# Patient Record
Sex: MALE | Race: WHITE | URBAN - METROPOLITAN AREA
[De-identification: names, ages, dates, MRNs, and addresses within clinical notes are randomized per-mention and may not be internally consistent; named-entity substitution may affect disease eponyms.]

---

## 2023-10-20 ENCOUNTER — OFFICE VISIT (OUTPATIENT)
Dept: FAMILY MEDICINE CLINIC | Facility: CLINIC | Age: 33
End: 2023-10-20

## 2023-10-20 VITALS
WEIGHT: 288 LBS | OXYGEN SATURATION: 95 % | HEIGHT: 72 IN | SYSTOLIC BLOOD PRESSURE: 130 MMHG | DIASTOLIC BLOOD PRESSURE: 82 MMHG | HEART RATE: 107 BPM | RESPIRATION RATE: 16 BRPM | TEMPERATURE: 98.8 F | BODY MASS INDEX: 39.01 KG/M2

## 2023-10-20 DIAGNOSIS — Z00.00 ANNUAL PHYSICAL EXAM: Primary | ICD-10-CM

## 2023-10-20 DIAGNOSIS — Z72.0 NICOTINE ABUSE: ICD-10-CM

## 2023-10-20 DIAGNOSIS — M25.562 CHRONIC PAIN OF LEFT KNEE: ICD-10-CM

## 2023-10-20 DIAGNOSIS — F90.9 ATTENTION DEFICIT HYPERACTIVITY DISORDER (ADHD), UNSPECIFIED ADHD TYPE: ICD-10-CM

## 2023-10-20 DIAGNOSIS — G89.29 CHRONIC PAIN OF LEFT KNEE: ICD-10-CM

## 2023-10-20 DIAGNOSIS — Z23 ENCOUNTER FOR IMMUNIZATION: ICD-10-CM

## 2023-10-20 DIAGNOSIS — F31.9 BIPOLAR DEPRESSION (HCC): ICD-10-CM

## 2023-10-20 DIAGNOSIS — L73.9 FOLLICULITIS: ICD-10-CM

## 2023-10-20 DIAGNOSIS — Z13.6 SCREENING FOR HEART DISEASE: ICD-10-CM

## 2023-10-20 DIAGNOSIS — Z13.29 SCREENING FOR THYROID DISORDER: ICD-10-CM

## 2023-10-20 DIAGNOSIS — G47.33 OSA (OBSTRUCTIVE SLEEP APNEA): ICD-10-CM

## 2023-10-20 DIAGNOSIS — Z13.1 SCREENING FOR DIABETES MELLITUS: ICD-10-CM

## 2023-10-20 DIAGNOSIS — F43.10 PTSD (POST-TRAUMATIC STRESS DISORDER): ICD-10-CM

## 2023-10-20 RX ORDER — GABAPENTIN 100 MG/1
100 CAPSULE ORAL DAILY
COMMUNITY
Start: 2023-09-01

## 2023-10-20 RX ORDER — CEPHALEXIN 500 MG/1
500 CAPSULE ORAL 2 TIMES DAILY
Qty: 20 CAPSULE | Refills: 0 | Status: SHIPPED | OUTPATIENT
Start: 2023-10-20 | End: 2023-10-30

## 2023-10-20 RX ORDER — LAMOTRIGINE 200 MG/1
200 TABLET ORAL DAILY
COMMUNITY
Start: 2023-09-01

## 2023-10-20 RX ORDER — MODAFINIL 100 MG/1
100 TABLET ORAL DAILY
COMMUNITY

## 2023-10-20 RX ORDER — NICOTINE 21 MG/24HR
1 PATCH, TRANSDERMAL 24 HOURS TRANSDERMAL EVERY 24 HOURS
Qty: 28 PATCH | Refills: 0 | Status: SHIPPED | OUTPATIENT
Start: 2023-10-20

## 2023-10-20 NOTE — ASSESSMENT & PLAN NOTE
Unremarkable exam, flu shot administered. Ordered screening labs. Encourage healthy diet, regular exercise.

## 2023-10-20 NOTE — PROGRESS NOTES
[facility]    NAME: Luann Elizabeth  AGE: 34 y.o. SEX: male  : 1990     DATE: 10/20/2023     Assessment and Plan:     Problem List Items Addressed This Visit          Respiratory    AMAURY (obstructive sleep apnea)     Compliant with cpap            Musculoskeletal and Integument    Folliculitis     Started after using his father's razor to shave his head. Using proactiv on his scalp, he says it helps but if he doesn't use it gets worse. Treat x10 days with cephalexin. Relevant Medications    cephalexin (KEFLEX) 500 mg capsule       Other    ADHD     Stable with current management. , followed by psych. Continue modafinil. Relevant Medications    modafinil (PROVIGIL) 100 mg tablet    nicotine (NICODERM CQ) 21 mg/24 hr TD 24 hr patch    Annual physical exam - Primary     Unremarkable exam, flu shot administered. Ordered screening labs. Encourage healthy diet, regular exercise. Relevant Orders    Comprehensive metabolic panel    CBC and differential    UA w Reflex to Microscopic w Reflex to Culture    Bipolar depression ([address])     Stable with current management. , continue current medication. Managed by psych. Relevant Medications    modafinil (PROVIGIL) 100 mg tablet    nicotine (NICODERM CQ) 21 mg/24 hr TD 24 hr patch    Chronic pain of left knee     No trauma or injury, unremarkable on exam.  Pain greatest when flexed or hyper extended. Referral to ortho. Relevant Orders    Ambulatory Referral to Orthopedic Surgery    Nicotine abuse     Wants to quit nicotine vape, start nrt patch 21 g daily. Wean as tolerated. Relevant Medications    nicotine (NICODERM CQ) 21 mg/24 hr TD 24 hr patch    PTSD (post-traumatic stress disorder)     Stable with current management, managed by psychiatry.          Relevant Medications    modafinil (PROVIGIL) 100 mg tablet    nicotine (V[place]) 21 mg/24 hr TD 24 hr patch     Other Visit Diagnoses       Encounter for immunization        Relevant Orders    influenza vaccine, quadrivalent, 0.5 mL, preservative-free, for adult and pediatric patients 6 mos+ (AFLURIA, FLUARIX, FLULAVAL, FLUZONE) (Completed)    Screening for heart disease        Relevant Orders    Lipid panel    Screening for diabetes mellitus        Relevant Orders    Comprehensive metabolic panel    Hemoglobin A1C    Screening for thyroid disorder        Relevant Orders    TSH, 3rd generation with Free T4 reflex            Immunizations and preventive care screenings were discussed with patient today. Appropriate education was printed on patient's after visit summary. Counseling:  Dental Health: discussed importance of regular tooth brushing, flossing, and dental visits. Injury prevention: discussed safety/seat belts, safety helmets, smoke detectors, carbon dioxide detectors, and smoking near bedding or upholstery. Sexual health: discussed sexually transmitted diseases, partner selection, use of condoms, avoidance of unintended pregnancy, and contraceptive alternatives. Exercise: the importance of regular exercise/physical activity was discussed. Recommend exercise 3-5 times per week for at least 30 minutes. BMI Counseling: Body mass index is 39.61 kg/m². The BMI is above normal. Nutrition recommendations include decreasing portion sizes, encouraging healthy choices of fruits and vegetables, consuming healthier snacks, moderation in carbohydrate intake, increasing intake of lean protein, reducing intake of saturated and trans fat and reducing intake of cholesterol. Exercise recommendations include moderate physical activity 150 minutes/week, exercising 3-5 times per week and strength training exercises. Rationale for BMI follow-up plan is due to patient being overweight or obese. No follow-ups on file.      Chief Complaint:     Chief Complaint   Patient presents with Establish Care     Patient being seen to establish care      History of Present Illness:     Adult Annual Physical   Patient here for a comprehensive physical exam. The patient reports problems - knee pain, scalp rash . Diet and Physical Activity  Diet/Nutrition: consuming 3-5 servings of fruits/vegetables daily and fairly well balanced  . Exercise: no formal exercise. Depression Screening  PHQ-2/9 Depression Screening    Little interest or pleasure in doing things: 0 - not at all  Feeling down, depressed, or hopeless: 0 - not at all  PHQ-2 Score: 0  PHQ-2 Interpretation: Negative depression screen       General Health  Sleep: sleeps poorly, gets 4-6 hours of sleep on average, and has sleep apnea . Hearing: normal - bilateral.  Vision: goes for regular eye exams and wears glasses and contacts. Dental: no dental visits for >1 year, brushes teeth once daily, and flosses teeth occasionally.  Health  History of STDs?: no.    Advanced Care Planning  Do you have an advanced directive? no  Do you have a durable medical power of ? no     Review of Systems:     Review of Systems   Constitutional:  Negative for activity change, appetite change, chills, diaphoresis, fatigue, fever and unexpected weight change. HENT:  Negative for hearing loss. Eyes:  Negative for visual disturbance. Respiratory:  Negative for cough, chest tightness and shortness of breath. Cardiovascular:  Negative for chest pain, palpitations and leg swelling. Gastrointestinal:  Negative for abdominal pain, constipation, diarrhea, nausea and vomiting. Genitourinary:  Negative for difficulty urinating, dysuria and frequency. Musculoskeletal:  Positive for arthralgias. Negative for joint swelling and myalgias. Skin:  Positive for rash (scalp). Allergic/Immunologic: Negative for environmental allergies.    Neurological:  Negative for dizziness, weakness, light-headedness (only if he does not eat), numbness and headaches. Psychiatric/Behavioral:  Negative for dysphoric mood, self-injury, sleep disturbance and suicidal ideas. The patient is not nervous/anxious. Past Medical History:     Past Medical History:   Diagnosis Date    ADHD     Arthritis     childhood    AMAURY (obstructive sleep apnea)     PTSD (post-traumatic stress disorder)       Past Surgical History:     Past Surgical History:   Procedure Laterality Date    KNEE SURGERY Right 2004    medial meniscus repair      Social History:     Social History     Socioeconomic History    Marital status: Single     Spouse name: None    Number of children: None    Years of education: None    Highest education level: None   Occupational History    None   Tobacco Use    Smoking status: Former     Packs/day: 0.50     Years: 9.00     Total pack years: 4.50     Types: Cigarettes     Quit date:      Years since quittin.8    Smokeless tobacco: Former     Types: Chew     Quit date:    Vaping Use    Vaping Use: Every day    Substances: Nicotine   Substance and Sexual Activity    Alcohol use: Not Currently     Comment: rarely (once a year)    Drug use: Never    Sexual activity: Yes     Partners: Female     Birth control/protection: I.U.D.    Other Topics Concern    None   Social History Narrative    Lives with girlfriend and daughter    Works full time    Caffeine: coffee and energy drinks daily     Social Determinants of Health     Financial Resource Strain: Not on file   Food Insecurity: Not on file   Transportation Needs: Not on file   Physical Activity: Not on file   Stress: Not on file   Social Connections: Not on file   Intimate Partner Violence: Not on file   Housing Stability: Not on file      Family History:     Family History   Problem Relation Age of Onset    Breast cancer Mother     No Known Problems Father     Febrile seizures Daughter     COPD Maternal Grandmother     Heart disease Maternal Grandfather     Coronary artery disease Maternal Grandfather Diabetes Maternal Grandfather     No Known Problems Half-Brother     No Known Problems Half-Brother       Current Medications:     Current Outpatient Medications   Medication Sig Dispense Refill    cephalexin (KEFLEX) 500 mg capsule Take 1 capsule (500 mg total) by mouth 2 (two) times a day for 10 days 20 capsule 0    gabapentin (NEURONTIN) 100 mg capsule Take 100 mg by mouth daily Take 2 (100mg) tablets daily      lamoTRIgine (LaMICtal) 200 MG tablet Take 200 mg by mouth daily      modafinil (PROVIGIL) 100 mg tablet Take 100 mg by mouth daily      nicotine (NICODERM CQ) 21 mg/24 hr TD 24 hr patch Place 1 patch on the skin over 24 hours every 24 hours 28 patch 0     No current facility-administered medications for this visit. Allergies: Allergies   Allergen Reactions    [id number]-Vrwuukd-Pramoxine Rash      Physical Exam:     /82 (BP Location: Left arm, Patient Position: Sitting, Cuff Size: Large)   Pulse (!) 107   Temp 98.8 °F (37.1 °C) (Tympanic)   Resp 16   Ht 5' 11.5" (1.816 m)   Wt 131 kg (288 lb)   SpO2 95%   BMI 39.61 kg/m²     Physical Exam  Vitals reviewed. Constitutional:       General: He is awake. He is not in acute distress. Appearance: Normal appearance. He is well-developed and well-groomed. He is not ill-appearing. HENT:      Head: Normocephalic and atraumatic. Right Ear: Hearing, tympanic membrane, ear canal and external ear normal.      Left Ear: Hearing, tympanic membrane, ear canal and external ear normal.      Nose: Nose normal.      Mouth/Throat:      Lips: Pink. Mouth: Mucous membranes are moist.      Pharynx: Oropharynx is clear. Tonsils: 2+ on the right. 2+ on the left. Eyes:      General: Lids are normal.      Conjunctiva/sclera: Conjunctivae normal.      Pupils: Pupils are equal, round, and reactive to light. Neck:      Thyroid: No thyromegaly. Vascular: Normal carotid pulses. No carotid bruit or JVD.    Cardiovascular:      Rate and Rhythm: Normal rate and regular rhythm. Pulses: Normal pulses. Heart sounds: Normal heart sounds. No murmur heard. Pulmonary:      Effort: Pulmonary effort is normal.      Breath sounds: Normal breath sounds. Abdominal:      General: Abdomen is flat. Bowel sounds are normal.      Palpations: Abdomen is soft. Tenderness: There is no abdominal tenderness. Musculoskeletal:         General: Normal range of motion. Cervical back: Normal range of motion. Left knee: No swelling or deformity. Normal range of motion. Tenderness present over the patellar tendon. Right lower leg: No edema. Left lower leg: No edema. Lymphadenopathy:      Cervical: No cervical adenopathy. Skin:     General: Skin is warm and dry. Capillary Refill: Capillary refill takes less than 2 seconds. Neurological:      Mental Status: He is alert and oriented to person, place, and time. Motor: Motor function is intact. Psychiatric:         Attention and Perception: Attention normal.         Mood and Affect: Mood normal.         Speech: Speech normal.         Behavior: Behavior normal. Behavior is cooperative. Thought Content:  Thought content normal.         Cognition and Memory: Cognition normal.         Judgment: Judgment normal.          Yaritza Medina, [address] [place] [address]

## 2023-10-20 NOTE — PATIENT INSTRUCTIONS
Wellness Visit for Adults   AMBULATORY CARE:   A wellness visit  is when you see your healthcare provider to get screened for health problems. Your healthcare provider will also give you advice on how to stay healthy. Write down your questions so you remember to ask them. Ask your healthcare provider how often you should have a wellness visit. What happens at a wellness visit:  Your healthcare provider will ask about your health, and your family history of health problems. This includes high blood pressure, heart disease, and cancer. He or she will ask if you have symptoms that concern you, if you smoke, and about your mood. You may also be asked about your intake of medicines, supplements, food, and alcohol. Any of the following may be done: Your weight  will be checked. Your height may also be checked so your body mass index (BMI) can be calculated. Your BMI shows if you are at a healthy weight. Your blood pressure  and heart rate will be checked. Your temperature may also be checked. Blood and urine tests  may be done. Blood tests may be done to check your cholesterol levels. Abnormal cholesterol levels increase your risk for heart disease and stroke. You may also need a blood or urine test to check for diabetes if you are at increased risk. Urine tests may be done to look for signs of an infection or kidney disease. A physical exam  includes checking your heartbeat and lungs with a stethoscope. Your healthcare provider may also check your skin to look for sun damage. Screening tests  may be recommended. A screening test is done to check for diseases that may not cause symptoms. The screening tests you may need depend on your age, gender, family history, and lifestyle habits. For example, colorectal screening may be recommended if you are 48years old or older. Screening tests you need if you are a woman:   A Pap smear  is used to screen for cervical cancer.  Pap smears are usually done every 3 to 5 years depending on your age. You may need them more often if you have had abnormal Pap smear test results in the past. Ask your healthcare provider how often you should have a Pap smear. A mammogram  is an x-ray of your breasts to screen for breast cancer. Experts recommend mammograms every 2 years starting at age 48 years. You may need a mammogram at age 52 years or younger if you have an increased risk for breast cancer. Talk to your healthcare provider about when you should start having mammograms and how often you need them. Vaccines you may need:   Get an influenza vaccine  every year. The influenza vaccine protects you from the flu. Several types of viruses cause the flu. The viruses change over time, so new vaccines are made each year. Get a tetanus-diphtheria (Td) booster vaccine  every 10 years. This vaccine protects you against tetanus and diphtheria. Tetanus is a severe infection that may cause painful muscle spasms and lockjaw. Diphtheria is a severe bacterial infection that causes a thick covering in the back of your mouth and throat. Get a human papillomavirus (HPV) vaccine  if you are female and aged 23 to 32 or male 23 to 24 and never received it. This vaccine protects you from HPV infection. HPV is the most common infection spread by sexual contact. HPV may also cause vaginal, penile, and anal cancers. Get a pneumococcal vaccine  if you are aged 72 years or older. The pneumococcal vaccine is an injection given to protect you from pneumococcal disease. Pneumococcal disease is an infection caused by pneumococcal bacteria. The infection may cause pneumonia, meningitis, or an ear infection. Get a shingles vaccine  if you are 60 or older, even if you have had shingles before. The shingles vaccine is an injection to protect you from the varicella-zoster virus. This is the same virus that causes chickenpox.  Shingles is a painful rash that develops in people who had chickenpox or have been exposed to the virus. How to eat healthy:  My Plate is a model for planning healthy meals. It shows the types and amounts of foods that should go on your plate. Fruits and vegetables make up about half of your plate, and grains and protein make up the other half. A serving of dairy is included on the side of your plate. The amount of calories and serving sizes you need depends on your age, gender, weight, and height. Examples of healthy foods are listed below:  Eat a variety of vegetables  such as dark green, red, and orange vegetables. You can also include canned vegetables low in sodium (salt) and frozen vegetables without added butter or sauces. Eat a variety of fresh fruits , canned fruit in 100% juice, frozen fruit, and dried fruit. Include whole grains. At least half of the grains you eat should be whole grains. Examples include whole-wheat bread, wheat pasta, brown rice, and whole-grain cereals such as oatmeal.    Eat a variety of protein foods such as seafood (fish and shellfish), lean meat, and poultry without skin (turkey and chicken). Examples of lean meats include pork leg, shoulder, or tenderloin, and beef round, sirloin, tenderloin, and extra lean ground beef. Other protein foods include eggs and egg substitutes, beans, peas, soy products, nuts, and seeds. Choose low-fat dairy products such as skim or 1% milk or low-fat yogurt, cheese, and cottage cheese. Limit unhealthy fats  such as butter, hard margarine, and shortening. Exercise:  Exercise at least 30 minutes per day on most days of the week. Some examples of exercise include walking, biking, dancing, and swimming. You can also fit in more physical activity by taking the stairs instead of the elevator or parking farther away from stores. Include muscle strengthening activities 2 days each week. Regular exercise provides many health benefits.  It helps you manage your weight, and decreases your risk for type 2 diabetes, heart disease, stroke, and high blood pressure. Exercise can also help improve your mood. Ask your healthcare provider about the best exercise plan for you. General health and safety guidelines:   Do not smoke. Nicotine and other chemicals in cigarettes and cigars can cause lung damage. Ask your healthcare provider for information if you currently smoke and need help to quit. E-cigarettes or smokeless tobacco still contain nicotine. Talk to your healthcare provider before you use these products. Limit alcohol. A drink of alcohol is 12 ounces of beer, 5 ounces of wine, or 1½ ounces of liquor. Lose weight, if needed. Being overweight increases your risk of certain health conditions. These include heart disease, high blood pressure, type 2 diabetes, and certain types of cancer. Protect your skin. Do not sunbathe or use tanning beds. Use sunscreen with a SPF 15 or higher. Apply sunscreen at least 15 minutes before you go outside. Reapply sunscreen every 2 hours. Wear protective clothing, hats, and sunglasses when you are outside. Drive safely. Always wear your seatbelt. Make sure everyone in your car wears a seatbelt. A seatbelt can save your life if you are in an accident. Do not use your cell phone when you are driving. This could distract you and cause an accident. Pull over if you need to make a call or send a text message. Practice safe sex. Use latex condoms if are sexually active and have more than one partner. Your healthcare provider may recommend screening tests for sexually transmitted infections (STIs). Wear helmets, lifejackets, and protective gear. Always wear a helmet when you ride a bike or motorcycle, go skiing, or play sports that could cause a head injury. Wear protective equipment when you play sports. Wear a lifejacket when you are on a boat or doing water sports.     © Copyright [facility] 2023 Information is for End User's use only and may not be sold, redistributed or otherwise used for commercial purposes. The above information is an  only. It is not intended as medical advice for individual conditions or treatments. Talk to your doctor, nurse or pharmacist before following any medical regimen to see if it is safe and effective for you.

## 2023-10-20 NOTE — ASSESSMENT & PLAN NOTE
No trauma or injury, unremarkable on exam.  Pain greatest when flexed or hyper extended. Referral to ortho.

## 2023-10-20 NOTE — ASSESSMENT & PLAN NOTE
Started after using his father's razor to shave his head. Using proactiv on his scalp, he says it helps but if he doesn't use it gets worse. Treat x10 days with cephalexin.

## 2023-10-21 LAB — HBA1C MFR BLD HPLC: 5.6 %

## 2023-11-16 ENCOUNTER — TELEPHONE (OUTPATIENT)
Dept: FAMILY MEDICINE CLINIC | Facility: CLINIC | Age: 33
End: 2023-11-16

## 2023-11-16 NOTE — TELEPHONE ENCOUNTER
----- Message from Sheela Dewey, [address] [facility] sent at 11/9/2023  5:43 PM EST -----  Please schedule pt for an office visit, I would like to review his cholesterol with him.  thanks

## 2023-11-17 ENCOUNTER — HOSPITAL ENCOUNTER (EMERGENCY)
Facility: HOSPITAL | Age: 33
Discharge: HOME | End: 2023-11-18
Attending: EMERGENCY MEDICINE
Payer: COMMERCIAL

## 2023-11-17 VITALS
TEMPERATURE: 100.4 F | SYSTOLIC BLOOD PRESSURE: 117 MMHG | HEART RATE: 105 BPM | RESPIRATION RATE: 20 BRPM | DIASTOLIC BLOOD PRESSURE: 80 MMHG | OXYGEN SATURATION: 97 %

## 2023-11-17 DIAGNOSIS — K52.9 GASTROENTERITIS: Primary | ICD-10-CM

## 2023-11-17 LAB
BASOPHILS # BLD AUTO: 0.02 THOUSANDS/ÂΜL (ref 0–0.1)
BASOPHILS NFR BLD AUTO: 0 % (ref 0–1)
EOSINOPHIL # BLD AUTO: 0.11 THOUSAND/ÂΜL (ref 0–0.61)
EOSINOPHIL NFR BLD AUTO: 1 % (ref 0–6)
ERYTHROCYTE [DISTWIDTH] IN BLOOD BY AUTOMATED COUNT: 12.7 % (ref 11.6–15.1)
HCT VFR BLD AUTO: 46.7 % (ref 36.5–49.3)
HGB BLD-MCNC: 16.3 G/DL (ref 12–17)
IMM GRANULOCYTES # BLD AUTO: 0.06 THOUSAND/UL (ref 0–0.2)
IMM GRANULOCYTES NFR BLD AUTO: 1 % (ref 0–2)
LYMPHOCYTES # BLD AUTO: 1.35 THOUSANDS/ÂΜL (ref 0.6–4.47)
LYMPHOCYTES NFR BLD AUTO: 14 % (ref 14–44)
MCH RBC QN AUTO: 30.4 PG (ref 26.8–34.3)
MCHC RBC AUTO-ENTMCNC: 34.9 G/DL (ref 31.4–37.4)
MCV RBC AUTO: 87 FL (ref 82–98)
MONOCYTES # BLD AUTO: 0.69 THOUSAND/ÂΜL (ref 0.17–1.22)
MONOCYTES NFR BLD AUTO: 7 % (ref 4–12)
NEUTROPHILS # BLD AUTO: 7.8 THOUSANDS/ÂΜL (ref 1.85–7.62)
NEUTS SEG NFR BLD AUTO: 77 % (ref 43–75)
NRBC BLD AUTO-RTO: 0 /100 WBCS
PLATELET # BLD AUTO: 269 THOUSANDS/UL (ref 149–390)
PMV BLD AUTO: 11.7 FL (ref 8.9–12.7)
RBC # BLD AUTO: 5.36 MILLION/UL (ref 3.88–5.62)
WBC # BLD AUTO: 10.03 THOUSAND/UL (ref 4.31–10.16)

## 2023-11-17 PROCEDURE — 36415 COLL VENOUS BLD VENIPUNCTURE: CPT

## 2023-11-17 PROCEDURE — 99284 EMERGENCY DEPT VISIT MOD MDM: CPT | Performed by: EMERGENCY MEDICINE

## 2023-11-17 PROCEDURE — 99284 EMERGENCY DEPT VISIT MOD MDM: CPT

## 2023-11-17 PROCEDURE — 85025 COMPLETE CBC W/AUTO DIFF WBC: CPT

## 2023-11-17 PROCEDURE — 83690 ASSAY OF LIPASE: CPT

## 2023-11-17 PROCEDURE — 96361 HYDRATE IV INFUSION ADD-ON: CPT

## 2023-11-17 PROCEDURE — 96374 THER/PROPH/DIAG INJ IV PUSH: CPT

## 2023-11-17 PROCEDURE — 80053 COMPREHEN METABOLIC PANEL: CPT

## 2023-11-17 RX ORDER — ONDANSETRON 2 MG/ML
4 INJECTION INTRAMUSCULAR; INTRAVENOUS ONCE
Status: COMPLETED | OUTPATIENT
Start: 2023-11-17 | End: 2023-11-17

## 2023-11-17 RX ORDER — ACETAMINOPHEN 325 MG/1
975 TABLET ORAL ONCE
Status: COMPLETED | OUTPATIENT
Start: 2023-11-17 | End: 2023-11-17

## 2023-11-17 RX ADMIN — ONDANSETRON 4 MG: 2 INJECTION INTRAMUSCULAR; INTRAVENOUS at 23:45

## 2023-11-17 RX ADMIN — ACETAMINOPHEN 975 MG: 325 TABLET, FILM COATED ORAL at 23:45

## 2023-11-17 RX ADMIN — SODIUM CHLORIDE 1000 ML: 0.9 INJECTION, SOLUTION INTRAVENOUS at 23:45

## 2023-11-18 LAB
ALBUMIN SERPL BCP-MCNC: 4.9 G/DL (ref 3.5–5)
ALP SERPL-CCNC: 62 U/L (ref 34–104)
ALT SERPL W P-5'-P-CCNC: 57 U/L (ref 7–52)
ANION GAP SERPL CALCULATED.3IONS-SCNC: 9 MMOL/L
AST SERPL W P-5'-P-CCNC: 34 U/L (ref 13–39)
BILIRUB SERPL-MCNC: 1.16 MG/DL (ref 0.2–1)
BUN SERPL-MCNC: 17 MG/DL (ref 5–25)
CALCIUM SERPL-MCNC: 9.5 MG/DL (ref 8.4–10.2)
CHLORIDE SERPL-SCNC: 98 MMOL/L (ref 96–108)
CO2 SERPL-SCNC: 28 MMOL/L (ref 21–32)
CREAT SERPL-MCNC: 1.36 MG/DL (ref 0.6–1.3)
GFR SERPL CREATININE-BSD FRML MDRD: 67 ML/MIN/1.73SQ M
GLUCOSE SERPL-MCNC: 108 MG/DL (ref 65–140)
LIPASE SERPL-CCNC: 43 U/L (ref 11–82)
POTASSIUM SERPL-SCNC: 3.7 MMOL/L (ref 3.5–5.3)
PROT SERPL-MCNC: 7.8 G/DL (ref 6.4–8.4)
SODIUM SERPL-SCNC: 135 MMOL/L (ref 135–147)

## 2023-11-18 RX ORDER — ONDANSETRON 4 MG/1
4 TABLET, FILM COATED ORAL EVERY 6 HOURS
Qty: 12 TABLET | Refills: 0 | Status: SHIPPED | OUTPATIENT
Start: 2023-11-18

## 2023-11-18 NOTE — ED ATTENDING ATTESTATION
11/17/2023  I, Sandra Owen MD, saw and evaluated the patient. I have discussed the patient with the resident/non-physician practitioner and agree with the resident's/non-physician practitioner's findings, Plan of Care, and MDM as documented in the resident's/non-physician practitioner's note, except where noted. All available labs and Radiology studies were reviewed. I was present for key portions of any procedure(s) performed by the resident/non-physician practitioner and I was immediately available to provide assistance. At this point I agree with the current assessment done in the Emergency Department. I have conducted an independent evaluation of this patient a history and physical is as follows:    23-year-old male presents to the emergency department for evaluation of abdominal pain, nausea, vomiting, and diarrhea. Patient is concerned for possible food poisoning. Emesis is nonbloody and nonbilious in nature. No urinary symptoms. Subjective fever, no chills. No chest pain or shortness of breath. No recent travel outside the country or antibiotic use. On exam, he is resting comfortably in bed in no acute distress, head is normocephalic atraumatic, pupils equal round reactive to light, no scleral icterus, mucous membranes are moist, heart is tachycardic, but regular rhythm with intact distal pulses, no increased work of breathing, respiratory distress, or stridor. Abdomen is soft, nontender nondistended without rebound or guarding. Differential diagnosis includes but is not limited to foodborne illness, viral illness, pancreatitis. Plan to check abdominal labs, treat with Zofran and IV fluids given initial tachycardia. Labs grossly unremarkable other than mildly elevated serum creatinine, no prior labs for comparison. Patient received a liter bolus of fluids with improvement of symptoms and tachycardia. He is stable for discharge home.   Given benign abdominal examination, do not believe any imaging is necessary.             ED Course         Critical Care Time  CriticalCare Time    Date/Time: 11/17/2023 11:58 PM    Performed by: Ernestine Dao MD  Authorized by: Ernestine Dao MD    Critical care provider statement:     Critical care time (minutes):  31    Critical care time was exclusive of:  Separately billable procedures and treating other patients and teaching time    Critical care was necessary to treat or prevent imminent or life-threatening deterioration of the following conditions:  Dehydration    Critical care was time spent personally by me on the following activities:  Obtaining history from patient or surrogate, development of treatment plan with patient or surrogate, evaluation of patient's response to treatment, examination of patient, ordering and performing treatments and interventions, ordering and review of laboratory studies, re-evaluation of patient's condition and review of old charts

## 2023-11-18 NOTE — ED PROVIDER NOTES
History  Chief Complaint   Patient presents with    Vomiting     Pt c/o having epigastric pain last night and then today having two episodes of projectile vomiting and two episodes of diarrhea. Pt states he also is having dizziness. Pt thought it might be food poisoning because he ate half of something last night and the second half this morning. Pt c/o 101.4 fever this morning, 100.3 this afternoon. HPI    Patient is a 58-year-old male with past medical history of bipolar disorder, currently on Lamictal therapy, presenting to the ED for evaluation of abdominal discomfort, nausea, vomiting, and diarrhea. Patient states that he had [employer] for dinner last night, afterward felt some mild bloating and nausea. He went to bed, but woke up this morning with nausea and 2 episodes of projectile nonbloody vomiting. Also endorses some intermittent upper abdominal pain with vomiting. Patient had the same food for breakfast this morning and had a few more episodes of vomiting throughout the day, as well as 2-3 episodes of watery diarrhea. Patient also feels as if he had a fever today. Patient denies cough or congestion, chest pain, shortness of breath, hematochezia, urinary complaints. Prior to Admission Medications   Prescriptions Last Dose Informant Patient Reported?  Taking?   gabapentin (NEURONTIN) 100 mg capsule   Yes No   Sig: Take 100 mg by mouth daily Take 2 (100mg) tablets daily   lamoTRIgine (LaMICtal) 200 MG tablet   Yes No   Sig: Take 200 mg by mouth daily   modafinil (PROVIGIL) 100 mg tablet   Yes No   Sig: Take 100 mg by mouth daily   nicotine (NICODERM CQ) 21 mg/24 hr TD 24 hr patch   No No   Sig: Place 1 patch on the skin over 24 hours every 24 hours      Facility-Administered Medications: None       Past Medical History:   Diagnosis Date    ADHD     Arthritis     childhood    AMAURY (obstructive sleep apnea)     PTSD (post-traumatic stress disorder)        Past Surgical History:   Procedure Laterality Date    KNEE SURGERY Right 2004    medial meniscus repair       Family History   Problem Relation Age of Onset    Breast cancer Mother     No Known Problems Father     Febrile seizures Daughter     COPD Maternal Grandmother     Heart disease Maternal Grandfather     Coronary artery disease Maternal Grandfather     Diabetes Maternal Grandfather     No Known Problems Half-Brother     No Known Problems Half-Brother      I have reviewed and agree with the history as documented. E-Cigarette/Vaping    E-Cigarette Use Current Every Day User     Comments 5% nicotine      E-Cigarette/Vaping Substances    Nicotine Yes     THC No     CBD No     Flavoring No     Other No     Unknown No      Social History     Tobacco Use    Smoking status: Former     Packs/day: 0.50     Years: 9.00     Total pack years: 4.50     Types: Cigarettes     Quit date:      Years since quittin.8    Smokeless tobacco: Former     Types: Chew     Quit date:    Vaping Use    Vaping Use: Every day    Substances: Nicotine   Substance Use Topics    Alcohol use: Not Currently     Comment: rarely (once a year)    Drug use: Never        Review of Systems   All other systems reviewed and are negative. Physical Exam  ED Triage Vitals [230]   Temperature Pulse Respirations Blood Pressure SpO2   100.4 °F (38 °C) 105 20 117/80 97 %      Temp Source Heart Rate Source Patient Position - Orthostatic VS BP Location FiO2 (%)   Oral Monitor Sitting Left arm --      Pain Score       4             Orthostatic Vital Signs  Vitals:    23 2230   BP: 117/80   Pulse: 105   Patient Position - Orthostatic VS: Sitting       Physical Exam  Vitals and nursing note reviewed. Constitutional:       General: He is not in acute distress. Appearance: Normal appearance. He is well-developed and normal weight. He is not ill-appearing, toxic-appearing or diaphoretic. HENT:      Head: Normocephalic and atraumatic.       Right Ear: External ear normal. Left Ear: External ear normal.      Nose: Nose normal. No congestion. Mouth/Throat:      Mouth: Mucous membranes are moist.      Pharynx: Oropharynx is clear. Eyes:      Extraocular Movements: Extraocular movements intact. Conjunctiva/sclera: Conjunctivae normal.   Cardiovascular:      Rate and Rhythm: Regular rhythm. Tachycardia present. Pulses: Normal pulses. Heart sounds: Normal heart sounds. No murmur heard. Pulmonary:      Effort: Pulmonary effort is normal. No respiratory distress. Breath sounds: Normal breath sounds. No wheezing, rhonchi or rales. Abdominal:      General: Abdomen is flat. Palpations: Abdomen is soft. Tenderness: There is no abdominal tenderness. There is no guarding or rebound. Musculoskeletal:         General: No swelling. Normal range of motion. Cervical back: Normal range of motion and neck supple. No tenderness. Skin:     General: Skin is warm and dry. Capillary Refill: Capillary refill takes less than 2 seconds. Findings: No erythema. Neurological:      General: No focal deficit present. Mental Status: He is alert and oriented to person, place, and time.    Psychiatric:         Mood and Affect: Mood normal.         ED Medications  Medications   sodium chloride 0.9 % bolus 1,000 mL (0 mL Intravenous Stopped 11/18/23 0101)   ondansetron (ZOFRAN) injection 4 mg (4 mg Intravenous Given 11/17/23 2345)   acetaminophen (TYLENOL) tablet 975 mg (975 mg Oral Given 11/17/23 2345)       Diagnostic Studies  Results Reviewed       Procedure Component Value Units Date/Time    Comprehensive metabolic panel [[id number]]  (Abnormal) Collected: 11/17/23 2345    Lab Status: Final result Specimen: Blood from Arm, Left Updated: 11/18/23 0017     Sodium 135 mmol/L      Potassium 3.7 mmol/L      Chloride 98 mmol/L      CO2 28 mmol/L      ANION GAP 9 mmol/L      BUN 17 mg/dL      Creatinine 1.36 mg/dL      Glucose 108 mg/dL      Calcium 9.5 mg/dL      AST 34 U/L      ALT 57 U/L      Alkaline Phosphatase 62 U/L      Total Protein 7.8 g/dL      Albumin 4.9 g/dL      Total Bilirubin 1.16 mg/dL      eGFR 67 ml/min/1.73sq m     Narrative:      [facility] ([facility]D):     Stage 1 with normal or high GFR (GFR > 90 mL/min/1.73 square meters)    Stage 2 Mild CKD (GFR = 60-89 mL/min/1.73 square meters)    Stage 3A Moderate CKD (GFR = 45-59 mL/min/1.73 square meters)    Stage 3B Moderate CKD (GFR = 30-44 mL/min/1.73 square meters)    Stage 4 Severe CKD (GFR = 15-29 mL/min/1.73 square meters)    Stage 5 End Stage CKD (GFR <15 mL/min/1.73 square meters)  Note: GFR calculation is accurate only with a steady state creatinine    Lipase [[id number]]  (Normal) Collected: 11/17/23 2345    Lab Status: Final result Specimen: Blood from Arm, Left Updated: 11/18/23 0017     Lipase 43 u/L     CBC and differential [[id number]]  (Abnormal) Collected: 11/17/23 2345    Lab Status: Final result Specimen: Blood from Arm, Left Updated: 11/17/23 2356     WBC 10.03 Thousand/uL      RBC 5.36 Million/uL      Hemoglobin 16.3 g/dL      Hematocrit 46.7 %      MCV 87 fL      MCH 30.4 pg      MCHC 34.9 g/dL      RDW 12.7 %      MPV 11.7 fL      Platelets 939 Thousands/uL      nRBC 0 /100 WBCs      Neutrophils Relative 77 %      Immat GRANS % 1 %      Lymphocytes Relative 14 %      Monocytes Relative 7 %      Eosinophils Relative 1 %      Basophils Relative 0 %      Neutrophils Absolute 7.80 Thousands/µL      Immature Grans Absolute 0.06 Thousand/uL      Lymphocytes Absolute 1.35 Thousands/µL      Monocytes Absolute 0.69 Thousand/µL      Eosinophils Absolute 0.11 Thousand/µL      Basophils Absolute 0.02 Thousands/µL                    No orders to display         Procedures  Procedures      ED Course  ED Course as of 11/18/23 0106   Sat Nov 18, 2023   0057 Creatinine(!): 1.36  Likely due to dehydration with vomiting.        Patient is a 34 year old male with no significant PMHx, presenting to the ED for evaluation of multiple episodes of nausea and vomiting, as well as intermittent upper abdominal pain, and a few episodes of diarrhea today. Patient endorsing fevers today. Exam documented above. Noted to have fever on arrival. Ddx gastroenteritis, gastritis, low suspicion for biliary pathology based on lack of tenderness on examination. Will check labs- CBC, CMP, lipase, and treat with IVF and Zofran. Patient tachycardic on arrival, likely related to mild dehydration. Will treat fevers with Tylenol. Diagnostics reviewed. Elevated Cr  likely due to dehydration. Fluids given and patient feels much better. No further vomiting or abdominal pain. Vitals stable. His HR has decreased from low 100s to 90s now. I reviewed all testing with the patient:  I gave oral return precautions for what to return for in addition to the written return precautions. The patient verbalized understanding of the discharge instructions and warnings that would necessitate return to the Emergency Department. I specifically highlighted areas of special concern regarding the written and verbal discharge instructions and return precautions. All questions were answered prior to discharge. Medical Decision Making  Amount and/or Complexity of Data Reviewed  Labs: ordered. Decision-making details documented in ED Course. Risk  OTC drugs. Prescription drug management. Disposition  Final diagnoses:   Gastroenteritis     Time reflects when diagnosis was documented in both MDM as applicable and the Disposition within this note       Time User Action Codes Description Comment    11/18/2023  1:00 AM Marissa Olsen Add [K52.9] Gastroenteritis           ED Disposition       ED Disposition   Discharge    Condition   Stable    Date/Time   Sat Nov 18, 2023  1:00 AM    Comment   [address] [place] [address] discharge to home/self care. Follow-up Information       Follow up With Specialties Details Why [id number] [facility], [address] [place] [address] Internal Medicine, Nurse Practitioner, Family Medicine Schedule an appointment as soon as possible for a visit in 3 days  [address] [address]  [phone]              Patient's Medications   Discharge Prescriptions    ONDANSETRON (ZOFRAN) 4 MG TABLET    Take 1 tablet (4 mg total) by mouth every 6 (six) hours       Start Date: 11/18/2023End Date: --       Order Dose: 4 mg       Quantity: 12 tablet    Refills: 0     No discharge procedures on file. PDMP Review       None             ED Provider  Attending physically available and evaluated Aureliano Elizondo. I managed the patient along with the ED Attending.     Electronically Signed by           Darshana Ac DO  11/18/23 0106

## 2023-11-18 NOTE — DISCHARGE INSTRUCTIONS
Follow up with PCP in 2-3 days. Take Zofran as prescribed. Return to the ED with any new/concerning issues.

## 2024-10-03 ENCOUNTER — OFFICE VISIT (OUTPATIENT)
Dept: FAMILY MEDICINE CLINIC | Facility: CLINIC | Age: 34
End: 2024-10-03
Payer: COMMERCIAL

## 2024-10-03 VITALS
SYSTOLIC BLOOD PRESSURE: 128 MMHG | RESPIRATION RATE: 15 BRPM | OXYGEN SATURATION: 97 % | TEMPERATURE: 98.2 F | BODY MASS INDEX: 40.94 KG/M2 | DIASTOLIC BLOOD PRESSURE: 86 MMHG | HEART RATE: 84 BPM | WEIGHT: 292.4 LBS | HEIGHT: 71 IN

## 2024-10-03 DIAGNOSIS — F31.9 BIPOLAR DEPRESSION (HCC): Primary | ICD-10-CM

## 2024-10-03 DIAGNOSIS — M25.522 LEFT ELBOW PAIN: ICD-10-CM

## 2024-10-03 DIAGNOSIS — E66.813 CLASS 3 SEVERE OBESITY DUE TO EXCESS CALORIES WITHOUT SERIOUS COMORBIDITY WITH BODY MASS INDEX (BMI) OF 40.0 TO 44.9 IN ADULT (HCC): ICD-10-CM

## 2024-10-03 DIAGNOSIS — E66.01 CLASS 3 SEVERE OBESITY DUE TO EXCESS CALORIES WITHOUT SERIOUS COMORBIDITY WITH BODY MASS INDEX (BMI) OF 40.0 TO 44.9 IN ADULT (HCC): ICD-10-CM

## 2024-10-03 PROCEDURE — 99213 OFFICE O/P EST LOW 20 MIN: CPT | Performed by: NURSE PRACTITIONER

## 2024-10-03 RX ORDER — SERTRALINE HYDROCHLORIDE 25 MG/1
25 TABLET, FILM COATED ORAL DAILY
COMMUNITY
Start: 2024-09-26

## 2024-10-03 NOTE — ASSESSMENT & PLAN NOTE
Care plan reviewed with patient. Patient verbalized understanding of the plan of care and contribute to goal setting. Problem: Intellectual/Education/Knowledge Deficit  Goal: Teaching initiated upon admission  Outcome: Met This Shift  Note: Patient verbalizes understanding to verbal information given on Bendeka,action and possible side effects. Aware to call MD if develop complications. Intervention: Verbal/written education provided  Note:   Chemotherapy Teaching     What is Chemotherapy   Drug action [x]   Method of Administration [x]   Handouts given []     Side Effects  Nausea/vomiting [x]   Diarrhea [x]   Fatigue [x]   Signs / Symptoms of infection [x]   Neutropenia [x]   Thrombocytopenia [x]   Alopecia [x]   neuropathy [x]   Brooks diet &  the importance of fluids [x]       Micellaneous  Importance of nutrition [x]   Importance of oral hygiene [x]   When to call the MD [x]   Monitoring labs [x]   Use of supportive services []     Explanation of Drug Regimen / Frequency  Bendeka     Comments  Verbalized understanding to drug,action,side effects and when to call MD         Problem: Discharge Planning  Goal: Knowledge of discharge instructions  Description: Knowledge of discharge instructions     Outcome: Met This Shift  Note: Verbalized understanding of discharge instructions, follow ups and when to call doctor   Intervention: Discharge to appropriate level of care  Note: Discuss discharge instructions, follow ups and when to call doctor. Problem: Falls - Risk of:  Goal: Will remain free from falls  Description: Will remain free from falls  Outcome: Met This Shift  Note: Free from falls while in infusion center.  Verbalized understanding of fall prevention and to ask for assistance with ambulation   Intervention: Assess medication that may increase risk of fall  Description: [TRUNCATED] Assess medication that may increase risk of fall - REMINDER(s): Anticholinergic burden, Anticonvulsant use, Stablle.  Continue lamotrigine, sertraline         Antidepressant use, Antihypertensive use, Antipsychotic use, Benzodiazepine use, Hypnotic medication use, Medication use multiple, ... [TRUNCATED] Assess medication that may increase risk of fall - REMINDER(s): Anticholinergic burden, Anticonvulsant use, Antidepressant use, Antihypertensive use, Antipsychotic use, Benzodiazepine use, Hypnotic medication use, Medication use multiple, ... Note: Assess for fall risk, instruct to ask for assistance with ambulation      Problem: Infection - Central Venous Catheter-Associated Bloodstream Infection:  Goal: Will show no infection signs and symptoms  Description: Will show no infection signs and symptoms  Outcome: Met This Shift  Note: Mediport site with no redness or warmth. Skin over port intact with no signs of breakdown noted. Patient verbalizes signs/symptoms of port infection and when to notify the physician. Intervention: Infection risk assessment  Description: Infection risk assessment  Note: Mediport site with no redness or warmth. Skin over port intact with no signs of breakdown noted. Patient verbalizes signs/symptoms of port infection and when to notify the physician.

## 2024-10-03 NOTE — PROGRESS NOTES
Ambulatory Visit  Name: Devon Bernardo      : 1990      MRN: [id number]  Encounter Provider: JENNIFER Horan  Encounter Date: 10/3/2024   Encounter department: [facility]    Assessment & Plan  Left elbow pain  Onset about 2 weeks ago, pain only with flexion against resistance with hand gripping associated with subjective weakness of the bicep.  Tender lateral epicondyle, strength normal.  Normal sensation, circulation.  Discussed management with nsaid use and surveillance, avoid overuse/triggering activity.  Pt prefers use of otc nsaids as opposed to prescription.  If symptoms not improving pt should contact me for referral to see ortho.       Class 3 severe obesity due to excess calories without serious comorbidity with body mass index (BMI) of 40.0 to 44.9 in adult ([facility])  Interested in trying wegovy, trying to lose weight with diet, exercise and wants to continue running but weight is limiting his ability to tolerate this.  He is eligible, will send prescription for prior authorization and discuss in more detail when he comes in on 10/25 for his physical.    Orders:    Semaglutide-Weight Management (WEGOVY) 0.25 MG/0.5ML; Inject 0.5 mL (0.25 mg total) under the skin once a week    Bipolar depression ([facility])  Stablle.  Continue lamotrigine, sertraline         Prior Authorization Clinical Questions for Weight Management Pharmacotherapy     Does the patient have a contraindication to medication prescribed for weight management? no  Does the patient have a diagnosis of obesity, confirmed by a BMI greater than or equal to 30 kg/m^2? yes  Does the patient have a BMI of greater than or equal to 27 kg/m^2 with at least one weight-related comorbidity/risk factor/complication (e.g. diabetes, dyslipidemia, coronary artery disease)? no  Weight-related comorbidity/ risk factor: AMAURY  Has the patient been on a weight loss regimen of low-calorie diet, increased physical activity, and lifestyle  "modifications for a minimum of 6 months? yes  Is the medication a controlled substance? yes  If Yes, has the PDMP been checked and verified? yes    History of Present Illness     Pt is a 33 y.o. male who is seen today for evaluation of  elbow pain.  Past medical history of obstructive sleep apnea, ADHD, bipolar disorder, PTSD.  He states he was playing with is daughter about 2 weeks ago and hyperextended his left elbow and it has been bothering him since then.  He states that occasionally when he bends his arm against any type of resistance, his bicep becomes  painful and week until he straightens his arm again.  No numbness or tingling.  There was no swelling or bruising after the injury.  Mildly tender to palpation at the lateral epicondyle.  He has not taken anything for pain because he does not have consistent pain.          Review of Systems   Musculoskeletal:  Positive for arthralgias (left elbow). Negative for joint swelling and myalgias.   Skin:  Negative for color change.   Neurological:  Positive for weakness (left bicep). Negative for numbness.           Objective     /86 (BP Location: Left arm, Patient Position: Sitting, Cuff Size: Standard)   Pulse 84   Temp 98.2 °F (36.8 °C) (Tympanic)   Resp 15   Ht 5' 11\" (1.803 m)   Wt 133 kg (292 lb 6.4 oz)   SpO2 97%   BMI 40.78 kg/m²     Physical Exam  Vitals reviewed.   Constitutional:       General: He is awake. He is not in acute distress.     Appearance: Normal appearance. He is well-developed and well-groomed. He is not ill-appearing.   Eyes:      General: Lids are normal.      Conjunctiva/sclera: Conjunctivae normal.   Cardiovascular:      Rate and Rhythm: Normal rate and regular rhythm.      Heart sounds: Normal heart sounds. No murmur heard.  Pulmonary:      Effort: Pulmonary effort is normal. No tachypnea, accessory muscle usage or respiratory distress.      Breath sounds: Normal breath sounds.   Musculoskeletal:      Right elbow: Normal.    "   Left elbow: No swelling or deformity. Normal range of motion. Tenderness present in lateral epicondyle.      Left forearm: Normal.      Left hand: Normal. No tenderness. Normal range of motion. Normal strength. Normal sensation.   Neurological:      Mental Status: He is alert and oriented to person, place, and time.   Psychiatric:         Attention and Perception: Attention normal.         Mood and Affect: Mood normal.         Speech: Speech normal.         Behavior: Behavior normal. Behavior is cooperative.         Thought Content: Thought content normal.         Cognition and Memory: Cognition normal.         Judgment: Judgment normal.

## 2024-10-04 ENCOUNTER — TELEPHONE (OUTPATIENT)
Age: 34
End: 2024-10-04

## 2024-10-04 NOTE — TELEPHONE ENCOUNTER
PA for wegovy SUBMITTED     via    [][facility]-KEY:    [x]Surescripts-Case ID # [id number]   []Availity-Auth ID #  NDC #    []Faxed to plan   []Other website    []Phone call Case ID #      Office notes sent, clinical questions answered. Awaiting determination    Turnaround time for your insurance to make a decision on your Prior Authorization can take 7-21 business days.

## 2024-10-04 NOTE — TELEPHONE ENCOUNTER
PA for WEGOVY 0.25 MG/0.5ML APPROVED     Date(s) approved September 4, 2024 to May 2, 2025     Case # [id number]       Patient advised by          [x]TorqBakhart Message  []Phone call   []LMOM  []L/M to call office as no active Communication consent on file  []Unable to leave detailed message as VM not approved on Communication consent       Pharmacy advised by    [x]Fax  []Phone call

## 2024-10-25 ENCOUNTER — OFFICE VISIT (OUTPATIENT)
Dept: FAMILY MEDICINE CLINIC | Facility: CLINIC | Age: 34
End: 2024-10-25
Payer: COMMERCIAL

## 2024-10-25 VITALS
WEIGHT: 292.8 LBS | RESPIRATION RATE: 16 BRPM | OXYGEN SATURATION: 97 % | BODY MASS INDEX: 40.99 KG/M2 | TEMPERATURE: 97.3 F | DIASTOLIC BLOOD PRESSURE: 84 MMHG | HEIGHT: 71 IN | HEART RATE: 79 BPM | SYSTOLIC BLOOD PRESSURE: 122 MMHG

## 2024-10-25 DIAGNOSIS — M25.522 LEFT ELBOW PAIN: ICD-10-CM

## 2024-10-25 DIAGNOSIS — E66.01 CLASS 3 SEVERE OBESITY DUE TO EXCESS CALORIES WITHOUT SERIOUS COMORBIDITY WITH BODY MASS INDEX (BMI) OF 40.0 TO 44.9 IN ADULT (HCC): ICD-10-CM

## 2024-10-25 DIAGNOSIS — Z13.29 SCREENING FOR THYROID DISORDER: ICD-10-CM

## 2024-10-25 DIAGNOSIS — Z23 ENCOUNTER FOR IMMUNIZATION: ICD-10-CM

## 2024-10-25 DIAGNOSIS — E66.813 CLASS 3 SEVERE OBESITY DUE TO EXCESS CALORIES WITHOUT SERIOUS COMORBIDITY WITH BODY MASS INDEX (BMI) OF 40.0 TO 44.9 IN ADULT (HCC): ICD-10-CM

## 2024-10-25 DIAGNOSIS — Z13.1 SCREENING FOR DIABETES MELLITUS: ICD-10-CM

## 2024-10-25 DIAGNOSIS — Z13.6 SCREENING FOR HEART DISEASE: ICD-10-CM

## 2024-10-25 DIAGNOSIS — Z00.00 ANNUAL PHYSICAL EXAM: Primary | ICD-10-CM

## 2024-10-25 PROCEDURE — 90656 IIV3 VACC NO PRSV 0.5 ML IM: CPT

## 2024-10-25 PROCEDURE — 99395 PREV VISIT EST AGE 18-39: CPT | Performed by: NURSE PRACTITIONER

## 2024-10-25 PROCEDURE — 90471 IMMUNIZATION ADMIN: CPT

## 2024-10-25 RX ORDER — SEMAGLUTIDE 0.5 MG/.5ML
INJECTION, SOLUTION SUBCUTANEOUS
Qty: 2 ML | Refills: 0 | Status: SHIPPED | OUTPATIENT
Start: 2024-10-25

## 2024-10-25 NOTE — ASSESSMENT & PLAN NOTE
Tolerating 0.25 mg without any side effects, no weight lost so far.  Will increase to 0.5 mg after next dose.    Orders:  •  Semaglutide-Weight Management (Wegovy) 0.5 MG/0.5ML; Inject 0.5 mg under the skin weekly

## 2024-10-25 NOTE — ASSESSMENT & PLAN NOTE
Unremarkable exam of adult male.  Flu vaccine administered.  Will get updated labs.  Reinforce healthy diet and regular exercise.    Orders:    Lipid panel; Future    Comprehensive metabolic panel; Future    UA w Reflex to Microscopic w Reflex to Culture; Future

## 2024-10-25 NOTE — PROGRESS NOTES
Adult Annual Physical  Name: Devon Bernardo      : 1990      MRN: [id number]  Encounter Provider: JENNIFER Horan  Encounter Date: 10/25/2024   Encounter department: [facility]    Assessment & Plan  Annual physical exam  Unremarkable exam of adult male.  Flu vaccine administered.  Will get updated labs.  Reinforce healthy diet and regular exercise.    Orders:    Lipid panel; Future    Comprehensive metabolic panel; Future    UA w Reflex to Microscopic w Reflex to Culture; Future    Class 3 severe obesity due to excess calories without serious comorbidity with body mass index (BMI) of 40.0 to 44.9 in adult (HCC)    Tolerating 0.25 mg without any side effects, no weight lost so far.  Will increase to 0.5 mg after next dose.    Orders:    Semaglutide-Weight Management (Wegovy) 0.5 MG/0.5ML; Inject 0.5 mg under the skin weekly    Left elbow pain  Seen earlier this month, not responding to rest, nsaids.  Referral to ortho.  Orders:    Ambulatory Referral to Orthopedic Surgery; Future    Encounter for immunization    Orders:    influenza vaccine preservative-free 0.5 mL IM (Fluzone, Afluria, Fluarix, Flulaval)    Immunizations and preventive care screenings were discussed with patient today. Appropriate education was printed on patient's after visit summary.    Counseling:  Dental Health: discussed importance of regular tooth brushing, flossing, and dental visits.  Injury prevention: discussed safety/seat belts, safety helmets, smoke detectors, carbon monoxide detectors, and smoking near bedding or upholstery.  Exercise: the importance of regular exercise/physical activity was discussed. Recommend exercise 3-5 times per week for at least 30 minutes.          History of Present Illness     Adult Annual Physical:  Patient presents for annual physical.     Diet and Physical Activity:  - Diet/Nutrition: well balanced diet.  - Exercise: no formal exercise.    General Health:  - Sleep:. 6-7  "hours, wears cpap  - Hearing: normal hearing bilateral ears.  - Vision: no vision problems, wears glasses and contacts and goes for regular eye exams.  - Dental: no dental visits for > 1 year.     Health:    - Urinary symptoms: none.     Review of Systems   Constitutional:  Negative for activity change, appetite change, chills, fatigue, fever and unexpected weight change.   HENT:  Negative for hearing loss.    Eyes:  Negative for visual disturbance.   Respiratory:  Negative for chest tightness and shortness of breath.    Cardiovascular:  Negative for chest pain, palpitations (only if stressed) and leg swelling.   Gastrointestinal:  Negative for abdominal pain, constipation, diarrhea, nausea and vomiting.   Genitourinary:  Negative for difficulty urinating, dysuria and frequency.   Musculoskeletal:  Positive for arthralgias (left elbow pain). Negative for myalgias.   Allergic/Immunologic: Negative for environmental allergies.   Neurological:  Negative for dizziness, weakness, light-headedness, numbness and headaches.   Psychiatric/Behavioral:  Negative for dysphoric mood, self-injury, sleep disturbance and suicidal ideas. The patient is nervous/anxious.          Objective     /84 (BP Location: Left arm, Patient Position: Sitting, Cuff Size: Standard)   Pulse 79   Temp (!) 97.3 °F (36.3 °C) (Tympanic)   Resp 16   Ht 5' 11\" (1.803 m)   Wt 133 kg (292 lb 12.8 oz)   SpO2 97%   BMI 40.84 kg/m²     Physical Exam  Vitals reviewed.   Constitutional:       General: He is awake. He is not in acute distress.     Appearance: Normal appearance. He is well-developed and well-groomed. He is not ill-appearing.   HENT:      Head: Normocephalic and atraumatic.      Right Ear: Hearing, tympanic membrane, ear canal and external ear normal.      Left Ear: Hearing, tympanic membrane, ear canal and external ear normal.      Nose: Nose normal.      Mouth/Throat:      Lips: Pink.      Mouth: Mucous membranes are moist.      " Pharynx: Oropharynx is clear.   Eyes:      General: Lids are normal.      Conjunctiva/sclera: Conjunctivae normal.      Pupils: Pupils are equal, round, and reactive to light.   Neck:      Thyroid: No thyromegaly.      Vascular: Normal carotid pulses. No carotid bruit or JVD.   Cardiovascular:      Rate and Rhythm: Normal rate and regular rhythm.      Pulses: Normal pulses.      Heart sounds: Normal heart sounds. No murmur heard.  Pulmonary:      Effort: Pulmonary effort is normal.      Breath sounds: Normal breath sounds.   Abdominal:      General: Abdomen is flat. Bowel sounds are normal.      Palpations: Abdomen is soft.      Tenderness: There is no abdominal tenderness.   Musculoskeletal:         General: Normal range of motion.      Cervical back: Normal range of motion.      Right lower leg: No edema.      Left lower leg: No edema.   Skin:     General: Skin is warm and dry.      Capillary Refill: Capillary refill takes less than 2 seconds.   Neurological:      Mental Status: He is alert and oriented to person, place, and time.      Motor: Motor function is intact.   Psychiatric:         Attention and Perception: Attention normal.         Mood and Affect: Mood normal.         Speech: Speech normal.         Behavior: Behavior normal. Behavior is cooperative.         Thought Content: Thought content normal.         Cognition and Memory: Cognition normal.         Judgment: Judgment normal.

## 2024-10-25 NOTE — PATIENT INSTRUCTIONS
"Patient Education     Routine physical for adults   The Basics   Written by the doctors and editors at [facility]   What is a physical? -- A physical is a routine visit, or \"check-up,\" with your doctor. You might also hear it called a \"wellness visit\" or \"preventive visit.\"  During each visit, the doctor will:   Ask about your physical and mental health   Ask about your habits, behaviors, and lifestyle   Do an exam   Give you vaccines if needed   Talk to you about any medicines you take   Give advice about your health   Answer your questions  Getting regular check-ups is an important part of taking care of your health. It can help your doctor find and treat any problems you have. But it's also important for preventing health problems.  A routine physical is different from a \"sick visit.\" A sick visit is when you see a doctor because of a health concern or problem. Since physicals are scheduled ahead of time, you can think about what you want to ask the doctor.  How often should I get a physical? -- It depends on your age and health. In general, for people age 21 years and older:   If you are younger than 50 years, you might be able to get a physical every 3 years.   If you are 50 years or older, your doctor might recommend a physical every year.  If you have an ongoing health condition, like diabetes or high blood pressure, your doctor will probably want to see you more often.  What happens during a physical? -- In general, each visit will include:   Physical exam - The doctor or nurse will check your height, weight, heart rate, and blood pressure. They will also look at your eyes and ears. They will ask about how you are feeling and whether you have any symptoms that bother you.   Medicines - It's a good idea to bring a list of all the medicines you take to each doctor visit. Your doctor will talk to you about your medicines and answer any questions. Tell them if you are having any side effects that bother you. You " "should also tell them if you are having trouble paying for any of your medicines.   Habits and behaviors - This includes:   Your diet   Your exercise habits   Whether you smoke, drink alcohol, or use drugs   Whether you are sexually active   Whether you feel safe at home  Your doctor will talk to you about things you can do to improve your health and lower your risk of health problems. They will also offer help and support. For example, if you want to quit smoking, they can give you advice and might prescribe medicines. If you want to improve your diet or get more physical activity, they can help you with this, too.   Lab tests, if needed - The tests you get will depend on your age and situation. For example, your doctor might want to check your:   Cholesterol   Blood sugar   Iron level   Vaccines - The recommended vaccines will depend on your age, health, and what vaccines you already had. Vaccines are very important because they can prevent certain serious or deadly infections.   Discussion of screening - \"Screening\" means checking for diseases or other health problems before they cause symptoms. Your doctor can recommend screening based on your age, risk, and preferences. This might include tests to check for:   Cancer, such as breast, prostate, cervical, ovarian, colorectal, prostate, lung, or skin cancer   Sexually transmitted infections, such as chlamydia and gonorrhea   Mental health conditions like depression and anxiety  Your doctor will talk to you about the different types of screening tests. They can help you decide which screenings to have. They can also explain what the results might mean.   Answering questions - The physical is a good time to ask the doctor or nurse questions about your health. If needed, they can refer you to other doctors or specialists, too.  Adults older than 65 years often need other care, too. As you get older, your doctor will talk to you about:   How to prevent falling at " home   Hearing or vision tests   Memory testing   How to take your medicines safely   Making sure that you have the help and support you need at home  All topics are updated as new evidence becomes available and our peer review process is complete.  This topic retrieved from [employer] on: May 02, 2024.  Topic 691936 Version 1.0  Release: 32.4.3 - C32.122  © 2024 UpToDate, Inc. and/or its affiliates. All rights reserved.  Consumer Information Use and Disclaimer   Disclaimer: This generalized information is a limited summary of diagnosis, treatment, and/or medication information. It is not meant to be comprehensive and should be used as a tool to help the user understand and/or assess potential diagnostic and treatment options. It does NOT include all information about conditions, treatments, medications, side effects, or risks that may apply to a specific patient. It is not intended to be medical advice or a substitute for the medical advice, diagnosis, or treatment of a health care provider based on the health care provider's examination and assessment of a patient's specific and unique circumstances. Patients must speak with a health care provider for complete information about their health, medical questions, and treatment options, including any risks or benefits regarding use of medications. This information does not endorse any treatments or medications as safe, effective, or approved for treating a specific patient. UpToDate, Inc. and its affiliates disclaim any warranty or liability relating to this information or the use thereof.The use of this information is governed by the Terms of Use, available at https://www.woltersCashflowtuna.comuwer.com/en/know/clinical-effectiveness-terms. 2024© UpToDate, Inc. and its affiliates and/or licensors. All rights reserved.  Copyright   © 2024 UpToDate, Inc. and/or its affiliates. All rights reserved.

## 2024-11-01 ENCOUNTER — OFFICE VISIT (OUTPATIENT)
Age: 34
End: 2024-11-01
Payer: COMMERCIAL

## 2024-11-01 ENCOUNTER — APPOINTMENT (OUTPATIENT)
Age: 34
End: 2024-11-01
Payer: COMMERCIAL

## 2024-11-01 VITALS — HEIGHT: 72 IN | BODY MASS INDEX: 39.39 KG/M2 | WEIGHT: 290.8 LBS

## 2024-11-01 DIAGNOSIS — M25.522 PAIN IN LEFT ELBOW: ICD-10-CM

## 2024-11-01 DIAGNOSIS — M25.522 LEFT ELBOW PAIN: ICD-10-CM

## 2024-11-01 DIAGNOSIS — M77.12 LATERAL EPICONDYLITIS OF LEFT ELBOW: Primary | ICD-10-CM

## 2024-11-01 PROCEDURE — 99203 OFFICE O/P NEW LOW 30 MIN: CPT | Performed by: PHYSICIAN ASSISTANT

## 2024-11-01 PROCEDURE — 73080 X-RAY EXAM OF ELBOW: CPT

## 2024-11-01 NOTE — PROGRESS NOTES
"Patient Name:  Devon Bernardo  MRN:  [id number]    Assessment & Plan     Left elbow pain, likely lateral epicondylitis.  Offered corticosteroid injection for pain control.  Patient declined.  Referral to physical therapy.  Recommend tennis elbow strap when active.  Advised against palm down lifting.  Offered prescription strength anti-inflammatory.  Patient declined and states he will take Aleve.  Follow-up in 6 weeks for repeat evaluation.    Chief Complaint     Left Elbow pain    History of the Present Illness     Devon Bernardo is a 33 y.o. left-hand-dominant male who reports to the office today for evaluation of his left elbow.  He notes an onset of pain approximately 2 months ago.  He states he \"hyperextended\" his left elbow while playing with his daughter.  Since then he has been experiencing left elbow pain localized to the lateral aspect.  He describes pain with repetitive use and lifting, primarily with palm down lifting.  He notes intermittent radiation into the forearm.  He notes occasional tingling in the forearm as well.  No numbness.  He currently takes Aleve with some improvement.  He notes weakness due to the pain.  No instability.    Physical Exam     Ht 6' (1.829 m)   Wt 132 kg (290 lb 12.8 oz)   BMI 39.44 kg/m²     Left elbow: No gross deformity.  Skin is intact without erythema ecchymosis or swelling.  There is tenderness over the lateral epicondyle.  No tenderness medial epicondyle, olecranon, and radial head.  Full elbow flexion and extension with slight discomfort with terminal flexion.  Full pronation and supination of the wrist without pain in the elbow.  Elbow stable to varus and valgus stress.  Pain in the lateral elbow with resisted wrist extension.  No pain in the elbow with resisted wrist flexion.  Skin warm and well-perfused distally.  Sensation intact median ulnar and radial nerves.  Negative Tinel's over the cubital tunnel.    Eyes: Anicteric sclerae.  ENT: Trachea midline.  Lungs: " Normal respiratory effort.  CV: Capillary refill is less than 2 seconds.  Skin: Intact without erythema.  Lymph: No palpable lymphadenopathy.  Neuro: Sensation is grossly intact to light touch.  Psych: Mood and affect are appropriate.    Data Review     I have personally reviewed pertinent films in PACS, and my interpretation follows:    X-rays left elbow 2024: No acute osseous abnormality.  No fracture or dislocation.  No significant degenerative changes.    Past Medical History:   Diagnosis Date    ADHD     Arthritis     childhood    AMAURY (obstructive sleep apnea)     PTSD (post-traumatic stress disorder)        Past Surgical History:   Procedure Laterality Date    KNEE SURGERY Right     medial meniscus repair       Allergies   Allergen Reactions    Whhzs-Rkfur-Bxysmuf-Pramoxine Rash       Current Outpatient Medications on File Prior to Visit   Medication Sig Dispense Refill    gabapentin (NEURONTIN) 100 mg capsule Take 100 mg by mouth daily Take 2 (100mg) tablets daily      lamoTRIgine (LaMICtal) 200 MG tablet Take 200 mg by mouth daily      modafinil (PROVIGIL) 100 mg tablet Take 100 mg by mouth daily      nicotine (NICODERM CQ) 21 mg/24 hr TD 24 hr patch Place 1 patch on the skin over 24 hours every 24 hours 28 patch 0    ondansetron (ZOFRAN) 4 mg tablet Take 1 tablet (4 mg total) by mouth every 6 (six) hours 12 tablet 0    Semaglutide-Weight Management (Wegovy) 0.5 MG/0.5ML Inject 0.5 mg under the skin weekly 2 mL 0    sertraline (ZOLOFT) 25 mg tablet Take 25 mg by mouth daily       No current facility-administered medications on file prior to visit.       Social History     Tobacco Use    Smoking status: Former     Current packs/day: 0.00     Average packs/day: 0.5 packs/day for 9.0 years (4.5 ttl pk-yrs)     Types: Cigarettes     Start date:      Quit date: 2016     Years since quittin.8    Smokeless tobacco: Former     Types: Chew     Quit date:    Vaping Use    Vaping status: Every Day     Substances: Nicotine   Substance Use Topics    Alcohol use: Not Currently     Comment: rarely (once a year)    Drug use: Never       Family History   Problem Relation Age of Onset    Breast cancer Mother     No Known Problems Father     Febrile seizures Daughter     COPD Maternal Grandmother     Heart disease Maternal Grandfather     Coronary artery disease Maternal Grandfather     Diabetes Maternal Grandfather     No Known Problems Half-Brother     No Known Problems Half-Brother        Review of Systems     As stated in the HPI. All other systems reviewed and are negative.

## 2024-11-19 DIAGNOSIS — E66.01 CLASS 3 SEVERE OBESITY DUE TO EXCESS CALORIES WITHOUT SERIOUS COMORBIDITY WITH BODY MASS INDEX (BMI) OF 40.0 TO 44.9 IN ADULT (HCC): Primary | ICD-10-CM

## 2024-11-19 DIAGNOSIS — E66.813 CLASS 3 SEVERE OBESITY DUE TO EXCESS CALORIES WITHOUT SERIOUS COMORBIDITY WITH BODY MASS INDEX (BMI) OF 40.0 TO 44.9 IN ADULT (HCC): Primary | ICD-10-CM

## 2024-11-19 RX ORDER — SEMAGLUTIDE 1 MG/.5ML
INJECTION, SOLUTION SUBCUTANEOUS
Qty: 2 ML | Refills: 0 | Status: SHIPPED | OUTPATIENT
Start: 2024-11-19

## 2025-05-09 ENCOUNTER — OFFICE VISIT (OUTPATIENT)
Dept: FAMILY MEDICINE CLINIC | Facility: CLINIC | Age: 35
End: 2025-05-09
Payer: COMMERCIAL

## 2025-05-09 ENCOUNTER — NURSE TRIAGE (OUTPATIENT)
Age: 35
End: 2025-05-09

## 2025-05-09 VITALS
HEIGHT: 72 IN | TEMPERATURE: 97.3 F | RESPIRATION RATE: 20 BRPM | OXYGEN SATURATION: 98 % | SYSTOLIC BLOOD PRESSURE: 120 MMHG | HEART RATE: 82 BPM | WEIGHT: 298.8 LBS | DIASTOLIC BLOOD PRESSURE: 92 MMHG | BODY MASS INDEX: 40.47 KG/M2

## 2025-05-09 DIAGNOSIS — E78.1 HIGH TRIGLYCERIDES: ICD-10-CM

## 2025-05-09 DIAGNOSIS — F31.9 BIPOLAR DEPRESSION (HCC): ICD-10-CM

## 2025-05-09 DIAGNOSIS — R42 DIZZINESS: Primary | ICD-10-CM

## 2025-05-09 PROCEDURE — 99214 OFFICE O/P EST MOD 30 MIN: CPT | Performed by: FAMILY MEDICINE

## 2025-05-09 RX ORDER — MECLIZINE HCL 12.5 MG 12.5 MG/1
12.5 TABLET ORAL EVERY 8 HOURS PRN
Qty: 30 TABLET | Refills: 0 | Status: SHIPPED | OUTPATIENT
Start: 2025-05-09

## 2025-05-09 RX ORDER — DEXTROAMPHETAMINE SACCHARATE, AMPHETAMINE ASPARTATE MONOHYDRATE, DEXTROAMPHETAMINE SULFATE AND AMPHETAMINE SULFATE 2.5; 2.5; 2.5; 2.5 MG/1; MG/1; MG/1; MG/1
1 CAPSULE, EXTENDED RELEASE ORAL DAILY
COMMUNITY
Start: 2025-03-20

## 2025-05-09 NOTE — TELEPHONE ENCOUNTER
"FOLLOW UP: Same day OV scheduled with Dr. Skinner at 3:45 for vital sign check.    REASON FOR CONVERSATION: Dizziness    SYMPTOMS: Patient states that despite drinking more water he has been having issues with dizziness when he states. Unable to report his blood pressure. Denies any other symptoms such as sickness, nausea, diarrhea.     OTHER: No prior cardiac history. Is on   Semaglutide-Weight Management (Wegovy) 1 MG/0.5M     DISPOSITION: See Today in Office      Reason for Disposition   MODERATE dizziness (e.g., interferes with normal activities)  (Exception: Dizziness caused by heat exposure, sudden standing, or poor fluid intake.)    Answer Assessment - Initial Assessment Questions  1. DESCRIPTION: \"Describe your dizziness.\"      Feels like room is spinning   2. LIGHTHEADED: \"Do you feel lightheaded?\" (e.g., somewhat faint, woozy, weak upon standing)      At times   3. VERTIGO: \"Do you feel like either you or the room is spinning or tilting?\" (i.e., vertigo)      Feels like room is spinning   4. SEVERITY: \"How bad is it?\"  \"Do you feel like you are going to faint?\" \"Can you stand and walk?\"      5. ONSET:  \"When did the dizziness begin?\"      About 2 weeks ago  6. AGGRAVATING FACTORS: \"Does anything make it worse?\" (e.g., standing, change in head position)      Standing up  7. HEART RATE: \"Can you tell me your heart rate?\" \"How many beats in 15 seconds?\"  (Note: Not all patients can do this.)        Denies   8. CAUSE: \"What do you think is causing the dizziness?\" (e.g., decreased fluids or food, diarrhea, emotional distress, heat exposure, new medicine, sudden standing, vomiting; unknown)      Denies-has increased fluids   9. RECURRENT SYMPTOM: \"Have you had dizziness before?\" If Yes, ask: \"When was the last time?\" \"What happened that time?\"      Denies   10. OTHER SYMPTOMS: \"Do you have any other symptoms?\" (e.g., fever, chest pain, vomiting, diarrhea, bleeding)        Pain in left chest occasionally   11. " "PREGNANCY: \"Is there any chance you are pregnant?\" \"When was your last menstrual period?\"        NA    Protocols used: Dizziness-Adult-[place]    "

## 2025-05-09 NOTE — PROGRESS NOTES
Name: Devon Bernardo      : 1990      MRN: [id number]  Encounter Provider: Terra Skinner MD  Encounter Date: 2025   [facility]  :  Assessment & Plan  Dizziness  Presents with positional vertigo. Started 2 weeks ago and worse when he goes from supine to standing position. Ear exam unremarkable. + Yashira Hallpike on the left. We followed with the eply maneuver. Little relief reported. Will also get labs and provided script for meclizine if symptoms do not improve with eply. Aware it if truly BPPV, it would be ineffective. Continue to hydrate. Follow up as needed   Orders:    Comprehensive metabolic panel; Future    CBC and differential; Future    Lipid panel; Future    Magnesium; Future    meclizine (ANTIVERT) 12.5 MG tablet; Take 1 tablet (12.5 mg total) by mouth every 8 (eight) hours as needed for dizziness or nausea    High triglycerides  Markedly elevated measuring 825 in    Not on fibrates or statins   Counseled on low cholesterol diet   Repeat labs     Orders:    Comprehensive metabolic panel; Future    Lipid panel; Future    Bipolar depression (HCC)  Stable               History of Present Illness   Present with dizziness   Describes it as the room spinning   Came on suddenly   Positional and worse when he goes from laying to sitting or sitting to standing   Started 2 weeks ago   Denies palpitations   Has had pain underneath the left nipple last seconds   Dizzines is constant   Dizziness not brought on by head movments  Pt thought he might of been dehydration but didn't noticed a difference when he increased his fluid intake   Denies blurry vision, sinus pressure, ear pain, head trauma, or weakness  Adderal increased from 5--> 10 mg several months ago  No other medication changes     Dizziness  Associated symptoms include chest pain and nausea. Pertinent negatives include no coughing, fever or vomiting.     Review of Systems   Constitutional:  Negative  for fever.   HENT:  Negative for ear discharge, ear pain, postnasal drip, rhinorrhea, sinus pressure and sinus pain.    Respiratory:  Negative for cough and shortness of breath.    Cardiovascular:  Positive for chest pain. Negative for palpitations.   Gastrointestinal:  Positive for nausea. Negative for vomiting.   Neurological:  Positive for dizziness.       Objective   /92 (BP Location: Left arm, Patient Position: Sitting, Cuff Size: Large)   Pulse 82   Temp (!) 97.3 °F (36.3 °C) (Temporal)   Resp 20   Ht 6' (1.829 m)   Wt 136 kg (298 lb 12.8 oz)   SpO2 98%   BMI 40.52 kg/m²      Physical Exam  Constitutional:       General: He is not in acute distress.     Appearance: Normal appearance. He is not ill-appearing.   HENT:      Right Ear: Tympanic membrane normal.      Left Ear: Tympanic membrane normal.   Cardiovascular:      Rate and Rhythm: Normal rate and regular rhythm.      Heart sounds: No murmur heard.  Pulmonary:      Effort: Pulmonary effort is normal.      Breath sounds: Normal breath sounds.   Skin:     Coloration: Skin is not pale.   Neurological:      Mental Status: He is alert and oriented to person, place, and time.      Cranial Nerves: No cranial nerve deficit.      Gait: Gait is intact.      Comments: + aileen hallpike on the left    Psychiatric:         Behavior: Behavior normal.

## 2025-05-09 NOTE — TELEPHONE ENCOUNTER
Regarding: dizziness for the past 2 wks  ----- Message from Mikael bang sent at 5/9/2025  1:14 PM EDT -----  Devon is having dizziness for the past 2 weeks, he schedule an appointment for Tuesday . He report that he is been drinking water but is not helping.